# Patient Record
Sex: MALE | Race: ASIAN | NOT HISPANIC OR LATINO
[De-identification: names, ages, dates, MRNs, and addresses within clinical notes are randomized per-mention and may not be internally consistent; named-entity substitution may affect disease eponyms.]

---

## 2021-09-07 ENCOUNTER — APPOINTMENT (OUTPATIENT)
Dept: CARDIOLOGY | Facility: CLINIC | Age: 76
End: 2021-09-07
Payer: MEDICARE

## 2021-09-07 VITALS
RESPIRATION RATE: 18 BRPM | TEMPERATURE: 97.8 F | DIASTOLIC BLOOD PRESSURE: 73 MMHG | BODY MASS INDEX: 27.96 KG/M2 | SYSTOLIC BLOOD PRESSURE: 127 MMHG | WEIGHT: 174 LBS | OXYGEN SATURATION: 96 % | HEART RATE: 71 BPM

## 2021-09-07 PROCEDURE — 99214 OFFICE O/P EST MOD 30 MIN: CPT

## 2021-09-07 PROCEDURE — 93000 ELECTROCARDIOGRAM COMPLETE: CPT

## 2021-09-07 NOTE — PHYSICAL EXAM
[General Appearance - Well Developed] : well developed [Normal Appearance] : normal appearance [Well Groomed] : well groomed [General Appearance - Well Nourished] : well nourished [No Deformities] : no deformities [General Appearance - In No Acute Distress] : no acute distress [Normal Conjunctiva] : the conjunctiva exhibited no abnormalities [Eyelids - No Xanthelasma] : the eyelids demonstrated no xanthelasmas [Normal Oral Mucosa] : normal oral mucosa [No Oral Pallor] : no oral pallor [No Oral Cyanosis] : no oral cyanosis [Normal Jugular Venous A Waves Present] : normal jugular venous A waves present [Normal Jugular Venous V Waves Present] : normal jugular venous V waves present [No Jugular Venous Mack A Waves] : no jugular venous mack A waves [Heart Rate And Rhythm] : heart rate and rhythm were normal [Heart Sounds] : normal S1 and S2 [Murmurs] : no murmurs present [Arterial Pulses Normal] : the arterial pulses were normal [Edema] : no peripheral edema present [Respiration, Rhythm And Depth] : normal respiratory rhythm and effort [Exaggerated Use Of Accessory Muscles For Inspiration] : no accessory muscle use [Auscultation Breath Sounds / Voice Sounds] : lungs were clear to auscultation bilaterally [Abdomen Soft] : soft [Abdomen Tenderness] : non-tender [Abdomen Mass (___ Cm)] : no abdominal mass palpated [Abnormal Walk] : normal gait [Gait - Sufficient For Exercise Testing] : the gait was sufficient for exercise testing [Nail Clubbing] : no clubbing of the fingernails [Cyanosis, Localized] : no localized cyanosis [Petechial Hemorrhages (___cm)] : no petechial hemorrhages [] : no ischemic changes [Oriented To Time, Place, And Person] : oriented to person, place, and time [Affect] : the affect was normal [Mood] : the mood was normal [No Anxiety] : not feeling anxious

## 2021-09-10 ENCOUNTER — APPOINTMENT (OUTPATIENT)
Dept: CARDIOLOGY | Facility: CLINIC | Age: 76
End: 2021-09-10
Payer: MEDICARE

## 2021-09-10 VITALS
BODY MASS INDEX: 27.96 KG/M2 | RESPIRATION RATE: 18 BRPM | OXYGEN SATURATION: 96 % | TEMPERATURE: 97 F | SYSTOLIC BLOOD PRESSURE: 128 MMHG | DIASTOLIC BLOOD PRESSURE: 69 MMHG | HEART RATE: 70 BPM | WEIGHT: 174 LBS

## 2021-09-10 PROCEDURE — 93306 TTE W/DOPPLER COMPLETE: CPT

## 2021-09-10 PROCEDURE — ZZZZZ: CPT

## 2021-09-10 PROCEDURE — 93015 CV STRESS TEST SUPVJ I&R: CPT

## 2021-09-13 NOTE — HISTORY OF PRESENT ILLNESS
[FreeTextEntry1] : 75-year-old male with CAD status post MI status post CABG status post ICD, hypertension, diabetes, hyperlipidemia, chronic renal insufficiency, presents for followup.  \par \par Patient was last seen on 3/23/15 for cardiac evaluation.  Given his DM, I advised him to undergo a stress echo to rule out cardiac ischemia. Unfortunately it was denied by insurance and appeal was denied as well. He may have a stress test every 2 years according to his insurance and he had a nuclear stress test last year. I advised him to continue his current medications. I advised him to return in 1 year, or sooner if he has any symptoms.\par

## 2021-09-13 NOTE — REASON FOR VISIT
[Symptom and Test Evaluation] : symptom and test evaluation [FreeTextEntry1] : 9/7/21 - Patient reports substernal CP for the past one week and SOB with exertion. CP is not tender. Patient denies palpitations. Patient denies lightheadedness. Patient reports that he is taking Aspirin and Cholesterol medications.  Patient reports that symptoms are similar to before his CABG but he is able to walk farther now. He reports vegetarian diet. I advised patient to undergo an echocardiogram and a treadmill stress test. \par \par 3/23/15 -  Patient had MI in 1999 and underwent CABG at [facility].  He underwent AICD placement 7 years ago.  Patient has been stable. He denies chest pain or shortness of breath with exertion.  He walks daily.  He reports no palpitations. Patient had a syncopal episode prior to AICD placement.

## 2021-09-13 NOTE — DISCUSSION/SUMMARY
[FreeTextEntry1] : 69-year-old male with CAD status post MI status post CABG status post ICD, hypertension, diabetes, hyperlipidemia, chronic renal insufficiency, presents for cardiac evaluation.  Patient had MI in 1999 and underwent CABG at [facility].  He underwent AICD placement 7 years ago.  Patient has been stable. He denies chest pain or shortness of breath with exertion.  He walks daily.  He reports no palpitations. Patient had a syncopal episode prior to AICD placement.\par \par (1) CAD s/p MI s/p CABG - Patient is stable.  Given his DM, I advised him to undergo a stress echo to rule out cardiac ischemia. Unfortunately it was denied by insurance and appeal was denied as well.  He may have a stress test every 2 years according to his insurance and he had a nuclear stress test last year.  I advised him to continue his current medications.  I advised him to return in 1 year, or sooner if he has any symptoms.\par \par (2) Followup - 1 year.

## 2021-09-21 ENCOUNTER — APPOINTMENT (OUTPATIENT)
Dept: DISASTER EMERGENCY | Facility: CLINIC | Age: 76
End: 2021-09-21

## 2021-09-21 ENCOUNTER — LABORATORY RESULT (OUTPATIENT)
Age: 76
End: 2021-09-21

## 2021-09-24 ENCOUNTER — OUTPATIENT (OUTPATIENT)
Dept: OUTPATIENT SERVICES | Facility: HOSPITAL | Age: 76
LOS: 1 days | Discharge: HOME | End: 2021-09-24
Payer: MEDICARE

## 2021-09-24 ENCOUNTER — NON-APPOINTMENT (OUTPATIENT)
Age: 76
End: 2021-09-24

## 2021-09-24 DIAGNOSIS — Z95.1 PRESENCE OF AORTOCORONARY BYPASS GRAFT: Chronic | ICD-10-CM

## 2021-09-24 DIAGNOSIS — Z95.810 PRESENCE OF AUTOMATIC (IMPLANTABLE) CARDIAC DEFIBRILLATOR: Chronic | ICD-10-CM

## 2021-09-24 LAB
ANION GAP SERPL CALC-SCNC: 11 MMOL/L — SIGNIFICANT CHANGE UP (ref 7–14)
BUN SERPL-MCNC: 33 MG/DL — HIGH (ref 7–23)
CALCIUM SERPL-MCNC: 9.5 MG/DL — SIGNIFICANT CHANGE UP (ref 8.4–10.5)
CHLORIDE SERPL-SCNC: 102 MMOL/L — SIGNIFICANT CHANGE UP (ref 98–107)
CO2 SERPL-SCNC: 25 MMOL/L — SIGNIFICANT CHANGE UP (ref 22–31)
CREAT SERPL-MCNC: 1.87 MG/DL — HIGH (ref 0.5–1.3)
GLUCOSE BLDC GLUCOMTR-MCNC: 136 MG/DL — HIGH (ref 70–99)
GLUCOSE SERPL-MCNC: 150 MG/DL — HIGH (ref 70–99)
HCT VFR BLD CALC: 46.7 % — SIGNIFICANT CHANGE UP (ref 39–50)
HGB BLD-MCNC: 16.5 G/DL — SIGNIFICANT CHANGE UP (ref 13–17)
MCHC RBC-ENTMCNC: 32.1 PG — SIGNIFICANT CHANGE UP (ref 27–34)
MCHC RBC-ENTMCNC: 35.3 GM/DL — SIGNIFICANT CHANGE UP (ref 32–36)
MCV RBC AUTO: 90.9 FL — SIGNIFICANT CHANGE UP (ref 80–100)
NRBC # BLD: 0 /100 WBCS — SIGNIFICANT CHANGE UP
NRBC # FLD: 0 K/UL — SIGNIFICANT CHANGE UP
PLATELET # BLD AUTO: 210 K/UL — SIGNIFICANT CHANGE UP (ref 150–400)
POTASSIUM SERPL-MCNC: 4.4 MMOL/L — SIGNIFICANT CHANGE UP (ref 3.5–5.3)
POTASSIUM SERPL-SCNC: 4.4 MMOL/L — SIGNIFICANT CHANGE UP (ref 3.5–5.3)
RBC # BLD: 5.14 M/UL — SIGNIFICANT CHANGE UP (ref 4.2–5.8)
RBC # FLD: 13 % — SIGNIFICANT CHANGE UP (ref 10.3–14.5)
SODIUM SERPL-SCNC: 138 MMOL/L — SIGNIFICANT CHANGE UP (ref 135–145)
WBC # BLD: 7.45 K/UL — SIGNIFICANT CHANGE UP (ref 3.8–10.5)
WBC # FLD AUTO: 7.45 K/UL — SIGNIFICANT CHANGE UP (ref 3.8–10.5)

## 2021-09-24 PROCEDURE — 76937 US GUIDE VASCULAR ACCESS: CPT | Mod: 26

## 2021-09-24 PROCEDURE — 93010 ELECTROCARDIOGRAM REPORT: CPT

## 2021-09-24 PROCEDURE — 93459 L HRT ART/GRFT ANGIO: CPT | Mod: 26

## 2021-09-24 RX ORDER — DUTASTERIDE 0.5 MG/1
1 CAPSULE, LIQUID FILLED ORAL
Qty: 0 | Refills: 0 | DISCHARGE

## 2021-09-24 RX ORDER — ASPIRIN/CALCIUM CARB/MAGNESIUM 324 MG
1 TABLET ORAL
Qty: 0 | Refills: 0 | DISCHARGE

## 2021-09-24 RX ORDER — LINAGLIPTIN 5 MG/1
1 TABLET, FILM COATED ORAL
Qty: 0 | Refills: 0 | DISCHARGE

## 2021-09-24 RX ORDER — LOSARTAN POTASSIUM 100 MG/1
1 TABLET, FILM COATED ORAL
Qty: 0 | Refills: 0 | DISCHARGE

## 2021-09-24 RX ORDER — SODIUM CHLORIDE 9 MG/ML
500 INJECTION INTRAMUSCULAR; INTRAVENOUS; SUBCUTANEOUS
Refills: 0 | Status: DISCONTINUED | OUTPATIENT
Start: 2021-09-24 | End: 2021-10-08

## 2021-09-24 RX ORDER — ISOSORBIDE MONONITRATE 60 MG/1
1 TABLET, EXTENDED RELEASE ORAL
Qty: 30 | Refills: 0
Start: 2021-09-24 | End: 2021-10-23

## 2021-09-24 RX ORDER — ERGOCALCIFEROL 1.25 MG/1
1 CAPSULE ORAL
Qty: 0 | Refills: 0 | DISCHARGE

## 2021-09-24 RX ORDER — OMEGA-3 ACID ETHYL ESTERS 1 G
1 CAPSULE ORAL
Qty: 0 | Refills: 0 | DISCHARGE

## 2021-09-24 RX ORDER — COLCHICINE 0.6 MG
1 TABLET ORAL
Qty: 0 | Refills: 0 | DISCHARGE

## 2021-09-24 RX ORDER — CARVEDILOL PHOSPHATE 80 MG/1
1 CAPSULE, EXTENDED RELEASE ORAL
Qty: 0 | Refills: 0 | DISCHARGE

## 2021-09-24 RX ORDER — ISOSORBIDE MONONITRATE 60 MG/1
1 TABLET, EXTENDED RELEASE ORAL
Qty: 0 | Refills: 0 | DISCHARGE

## 2021-09-24 RX ORDER — ALLOPURINOL 300 MG
1 TABLET ORAL
Qty: 0 | Refills: 0 | DISCHARGE

## 2021-09-24 RX ORDER — CLOPIDOGREL BISULFATE 75 MG/1
1 TABLET, FILM COATED ORAL
Qty: 0 | Refills: 0 | DISCHARGE

## 2021-09-24 RX ORDER — SODIUM CHLORIDE 9 MG/ML
3 INJECTION INTRAMUSCULAR; INTRAVENOUS; SUBCUTANEOUS EVERY 8 HOURS
Refills: 0 | Status: DISCONTINUED | OUTPATIENT
Start: 2021-09-24 | End: 2021-10-08

## 2021-09-24 RX ORDER — ATORVASTATIN CALCIUM 80 MG/1
1 TABLET, FILM COATED ORAL
Qty: 0 | Refills: 0 | DISCHARGE

## 2021-09-24 RX ADMIN — SODIUM CHLORIDE 75 MILLILITER(S): 9 INJECTION INTRAMUSCULAR; INTRAVENOUS; SUBCUTANEOUS at 10:44

## 2021-09-24 NOTE — H&P CARDIOLOGY - NS MD HP PULSE DORSALIS
Brunner, Steven (MD)  Pediatrics  [address] [place] [address]  Phone: [phone]  Fax: [phone]  Follow Up Time: right normal/left normal

## 2021-09-24 NOTE — H&P CARDIOLOGY - NSICDXPASTMEDICALHX_GEN_ALL_CORE_FT
PAST MEDICAL HISTORY:  CAD (coronary artery disease)     CKD (chronic kidney disease)     Diabetes     HLD (hyperlipidemia)     HTN (hypertension)     MI (myocardial infarction)

## 2021-09-24 NOTE — H&P CARDIOLOGY - NSICDXPASTSURGICALHX_GEN_ALL_CORE_FT
PAST SURGICAL HISTORY:  S/P CABG (coronary artery bypass graft)     S/P ICD (internal cardiac defibrillator) procedure

## 2021-09-24 NOTE — H&P CARDIOLOGY - HISTORY OF PRESENT ILLNESS
77 y/o M w/ PMH of  CAD s/p CABG(1999), MI, s/p ICD, HTN, DM, HLD and chronic renal insufficiency presents for cardiac catheretization. Pt states that he has been experiencing exertional chest pain and shortness of breath for the past month. Patient's symptoms are relived with 5 minutes of rest. Echocardiogram showed normal LV function w/ EF 55% and inferior wall akinesis + inferolateral hypokinesis. Pt denies N/V/D, fevers, chills, cough, palpitations, syncope, orthopnea, nocturnal paroxysmal dyspnea, edema, cyanosis, heart murmurs, varicosities, phlebitis, claudication.

## 2021-09-27 PROBLEM — I25.10 ATHEROSCLEROTIC HEART DISEASE OF NATIVE CORONARY ARTERY WITHOUT ANGINA PECTORIS: Chronic | Status: ACTIVE | Noted: 2021-09-24

## 2021-09-27 PROBLEM — E11.9 TYPE 2 DIABETES MELLITUS WITHOUT COMPLICATIONS: Chronic | Status: ACTIVE | Noted: 2021-09-24

## 2021-09-27 PROBLEM — I10 ESSENTIAL (PRIMARY) HYPERTENSION: Chronic | Status: ACTIVE | Noted: 2021-09-24

## 2021-09-27 PROBLEM — N18.9 CHRONIC KIDNEY DISEASE, UNSPECIFIED: Chronic | Status: ACTIVE | Noted: 2021-09-24

## 2021-09-27 PROBLEM — E78.5 HYPERLIPIDEMIA, UNSPECIFIED: Chronic | Status: ACTIVE | Noted: 2021-09-24

## 2021-09-27 PROBLEM — I21.9 ACUTE MYOCARDIAL INFARCTION, UNSPECIFIED: Chronic | Status: ACTIVE | Noted: 2021-09-24

## 2021-09-29 NOTE — PHYSICAL EXAM
[Normal Appearance] : normal appearance [General Appearance - Well Developed] : well developed [Well Groomed] : well groomed [General Appearance - Well Nourished] : well nourished [No Deformities] : no deformities [General Appearance - In No Acute Distress] : no acute distress [Normal Conjunctiva] : the conjunctiva exhibited no abnormalities [Eyelids - No Xanthelasma] : the eyelids demonstrated no xanthelasmas [Normal Oral Mucosa] : normal oral mucosa [No Oral Pallor] : no oral pallor [No Oral Cyanosis] : no oral cyanosis [Normal Jugular Venous A Waves Present] : normal jugular venous A waves present [Normal Jugular Venous V Waves Present] : normal jugular venous V waves present [No Jugular Venous Mack A Waves] : no jugular venous mack A waves [Heart Rate And Rhythm] : heart rate and rhythm were normal [Heart Sounds] : normal S1 and S2 [Murmurs] : no murmurs present [Arterial Pulses Normal] : the arterial pulses were normal [Edema] : no peripheral edema present [Exaggerated Use Of Accessory Muscles For Inspiration] : no accessory muscle use [Respiration, Rhythm And Depth] : normal respiratory rhythm and effort [Auscultation Breath Sounds / Voice Sounds] : lungs were clear to auscultation bilaterally [Abdomen Soft] : soft [Abdomen Tenderness] : non-tender [Abdomen Mass (___ Cm)] : no abdominal mass palpated [Abnormal Walk] : normal gait [Gait - Sufficient For Exercise Testing] : the gait was sufficient for exercise testing [Nail Clubbing] : no clubbing of the fingernails [Cyanosis, Localized] : no localized cyanosis [Petechial Hemorrhages (___cm)] : no petechial hemorrhages [] : no ischemic changes [Oriented To Time, Place, And Person] : oriented to person, place, and time [Affect] : the affect was normal [Mood] : the mood was normal [No Anxiety] : not feeling anxious

## 2021-09-30 ENCOUNTER — APPOINTMENT (OUTPATIENT)
Dept: CARDIOLOGY | Facility: CLINIC | Age: 76
End: 2021-09-30
Payer: MEDICARE

## 2021-09-30 VITALS
TEMPERATURE: 98 F | RESPIRATION RATE: 17 BRPM | SYSTOLIC BLOOD PRESSURE: 126 MMHG | HEART RATE: 62 BPM | OXYGEN SATURATION: 96 % | DIASTOLIC BLOOD PRESSURE: 65 MMHG | BODY MASS INDEX: 27.8 KG/M2 | WEIGHT: 173 LBS

## 2021-09-30 DIAGNOSIS — I21.9 ACUTE MYOCARDIAL INFARCTION, UNSPECIFIED: ICD-10-CM

## 2021-09-30 DIAGNOSIS — E78.5 HYPERLIPIDEMIA, UNSPECIFIED: ICD-10-CM

## 2021-09-30 PROCEDURE — 99213 OFFICE O/P EST LOW 20 MIN: CPT

## 2021-10-24 NOTE — PHYSICAL EXAM
[General Appearance - Well Developed] : well developed [Normal Appearance] : normal appearance [Well Groomed] : well groomed [General Appearance - Well Nourished] : well nourished [No Deformities] : no deformities [Normal Conjunctiva] : the conjunctiva exhibited no abnormalities [General Appearance - In No Acute Distress] : no acute distress [Eyelids - No Xanthelasma] : the eyelids demonstrated no xanthelasmas [Normal Oral Mucosa] : normal oral mucosa [No Oral Pallor] : no oral pallor [No Oral Cyanosis] : no oral cyanosis [Normal Jugular Venous A Waves Present] : normal jugular venous A waves present [Normal Jugular Venous V Waves Present] : normal jugular venous V waves present [No Jugular Venous Mack A Waves] : no jugular venous mack A waves [Heart Rate And Rhythm] : heart rate and rhythm were normal [Heart Sounds] : normal S1 and S2 [Murmurs] : no murmurs present [Arterial Pulses Normal] : the arterial pulses were normal [Edema] : no peripheral edema present [Respiration, Rhythm And Depth] : normal respiratory rhythm and effort [Exaggerated Use Of Accessory Muscles For Inspiration] : no accessory muscle use [Auscultation Breath Sounds / Voice Sounds] : lungs were clear to auscultation bilaterally [Abdomen Soft] : soft [Abdomen Tenderness] : non-tender [Abdomen Mass (___ Cm)] : no abdominal mass palpated [Abnormal Walk] : normal gait [Gait - Sufficient For Exercise Testing] : the gait was sufficient for exercise testing [Nail Clubbing] : no clubbing of the fingernails [Cyanosis, Localized] : no localized cyanosis [Petechial Hemorrhages (___cm)] : no petechial hemorrhages [] : no ischemic changes [Oriented To Time, Place, And Person] : oriented to person, place, and time [Affect] : the affect was normal [Mood] : the mood was normal [No Anxiety] : not feeling anxious

## 2021-10-24 NOTE — REASON FOR VISIT
[Symptom and Test Evaluation] : symptom and test evaluation [FreeTextEntry1] : 9/30/21 - Patient reports feeling better on Imdur 60 mg but still symptomatic.  I advised patient to start Ranexa 500 mg BID.\par \par 9/7/21 - Patient reports substernal CP for the past one week and SOB with exertion. CP is not tender. Patient denies palpitations. Patient denies lightheadedness. Patient reports that he is taking Aspirin and Cholesterol medications.  Patient reports that symptoms are similar to before his CABG but he is able to walk farther now. He reports vegetarian diet.  Patient underwent an echocardiogram and it showed normal LV function with inferior akinesis and inferolateral hypokinesis. Patient underwent a treadmill stress test and completed 3.5 minutes of Juancarlos protocol.  There were non-diagnostic ST depressions on ECG.  He had CP.  Following treadmill stress, there was echocardiographic evidence of anterolateral ischemia.  Cath at [facility] by Dr. Oliver showed patent LIMA to LAD and closed SVG grafts.  LM lesion compromises D1 flow.  His Imdur was increased to 60 mg.\par \par 3/23/15 -  Patient had MI in 1999 and underwent CABG at [facility].  He underwent AICD placement 7 years ago.  Patient has been stable. He denies chest pain or shortness of breath with exertion.  He walks daily.  He reports no palpitations. Patient had a syncopal episode prior to AICD placement.

## 2021-10-24 NOTE — HISTORY OF PRESENT ILLNESS
[FreeTextEntry1] : 75-year-old male with CAD s/p MI s/p CABG 1999, s/p ICD around 2008, HTN, DM, HLD, chronic renal insufficiency, presents for followup.  \par \par Patient was last seen on 9/7/21.  Patient underwent an echocardiogram and it showed normal LV function with inferior akinesis and inferolateral hypokinesis. Patient underwent a treadmill stress test and completed 3.5 minutes of Juancarlos protocol.  There were non-diagnostic ST depressions on ECG.  He had CP.  Following treadmill stress, there was echocardiographic evidence of anterolateral ischemia.  I advised patient to start NTPatch 0.1 mg (not realizing that pt is on Imdur 30 mg already).  Cath at [facility] by Dr. Oliver showed patent LIMA to LAD and closed SVG grafts.  LM lesion compromises D1 flow. \par \par He is on ASA 81 mg, Plavix 75 mg, Atorvastatin 40 mg for CAD.  He is on Imdur 60 mg, Metoprolol ER 25 mg, and  Losartan 25 mg for HTN.

## 2021-10-28 ENCOUNTER — APPOINTMENT (OUTPATIENT)
Dept: CARDIOLOGY | Facility: CLINIC | Age: 76
End: 2021-10-28
Payer: MEDICARE

## 2021-10-28 VITALS
TEMPERATURE: 97.3 F | RESPIRATION RATE: 17 BRPM | BODY MASS INDEX: 27.8 KG/M2 | WEIGHT: 173 LBS | HEART RATE: 69 BPM | DIASTOLIC BLOOD PRESSURE: 69 MMHG | OXYGEN SATURATION: 97 % | SYSTOLIC BLOOD PRESSURE: 134 MMHG

## 2021-10-28 PROCEDURE — 99213 OFFICE O/P EST LOW 20 MIN: CPT

## 2021-11-03 NOTE — HISTORY OF PRESENT ILLNESS
[FreeTextEntry1] : 10/28/21 - Patient feels that the new medication is helping. He is stable and feels fine when he does not exert himself. Patient does not take BP at home. Patient reports that he can tell when his BP is elevated. Patient denies bleeding issues. FU in 3 months. \par \par 9/7/21 - Patient reports substernal CP for the past one week and SOB with exertion. CP is not tender. Patient denies palpitations. Patient denies lightheadedness. Patient reports that he is taking Aspirin and Cholesterol medications.  Patient reports that symptoms are similar to before his CABG but he is able to walk farther now. He reports vegetarian diet.  Patient underwent an echocardiogram and it showed normal LV function with inferior akinesis and inferolateral hypokinesis. Patient underwent a treadmill stress test and completed 3.5 minutes of Juancarlos protocol.  There were non-diagnostic ST depressions on ECG.  He had CP.  Following treadmill stress, there was echocardiographic evidence of anterolateral ischemia.  Cath at [facility] by Dr. Oliver showed patent LIMA to LAD and closed SVG grafts.  LM lesion compromises D1 flow.  His Imdur was increased to 60 mg.\par \par 3/23/15 -  Patient had MI in 1999 and underwent CABG at [facility].  He underwent AICD placement 7 years ago.  Patient has been stable. He denies chest pain or shortness of breath with exertion.  He walks daily.  He reports no palpitations. Patient had a syncopal episode prior to AICD placement.

## 2021-11-03 NOTE — REASON FOR VISIT
[Symptom and Test Evaluation] : symptom and test evaluation [FreeTextEntry1] : 76-year-old male with CAD s/p MI s/p CABG 1999, s/p ICD around 2008, HTN, DM, HLD, chronic renal insufficiency, presents for followup.  \par \par Patient was last seen on 9/30/21 - Patient reports feeling better on Imdur 60 mg but still symptomatic.  I advised patient to start Ranexa 500 mg BID.\par \par He is on ASA 81 mg, Plavix 75 mg, Atorvastatin 40 mg for CAD.  He is on Imdur 60 mg, Metoprolol ER 25 mg, and  Losartan 25 mg for HTN.  He is on Ranexa 500 mg BID for angina.9/30/21 - Patient reports feeling better on Imdur 60 mg but still symptomatic.  I advised patient to start Ranexa 500 mg BID.\par \par

## 2022-01-04 ENCOUNTER — RX RENEWAL (OUTPATIENT)
Age: 77
End: 2022-01-04

## 2022-02-05 ENCOUNTER — RX RENEWAL (OUTPATIENT)
Age: 77
End: 2022-02-05

## 2022-02-16 NOTE — REASON FOR VISIT
[FreeTextEntry1] : 76-year-old male with CAD s/p MI s/p CABG 1999, s/p ICD around 2008, HTN, DM, HLD, chronic renal insufficiency, presents for followup.  \par \par Patient was last seen on 10/28/21.\par \par He is on ASA 81 mg, Plavix 75 mg, Atorvastatin 40 mg for CAD.  He is on Imdur 60 mg, Metoprolol ER 25 mg, and Losartan 25 mg for HTN.  He is on Ranexa 500 mg BID for angina.\par \par

## 2022-02-16 NOTE — HISTORY OF PRESENT ILLNESS
[FreeTextEntry1] : 10/28/21 - Patient feels that the new medication is helping. He is stable and feels fine when he does not exert himself. Patient does not take BP at home. Patient reports that he can tell when his BP is elevated. Patient denies bleeding issues. FU in 3 months. \par \par 9/7/21 - Patient reports substernal CP for the past one week and SOB with exertion. CP is not tender. Patient denies palpitations. Patient denies lightheadedness. Patient reports that he is taking Aspirin and Cholesterol medications.  Patient reports that symptoms are similar to before his CABG but he is able to walk farther now. He reports vegetarian diet.  Patient underwent an echocardiogram and it showed normal LV function with inferior akinesis and inferolateral hypokinesis. Patient underwent a treadmill stress test and completed 3.5 minutes of Juancarlos protocol.  There were non-diagnostic ST depressions on ECG.  He had CP.  Following treadmill stress, there was echocardiographic evidence of anterolateral ischemia.  Cath at [facility] by Dr. Oliver showed patent LIMA to LAD and closed SVG grafts.  LM lesion compromises D1 flow.  His Imdur was increased to 60 mg.\par \par 3/23/15 -  Patient had MI in 1999 and underwent CABG at [facility].  He underwent AICD placement 7 years ago.  Patient has been stable. He denies chest pain or shortness of breath with exertion.  He walks daily.  He reports no palpitations. Patient had a syncopal episode prior to AICD placement.

## 2022-02-22 ENCOUNTER — APPOINTMENT (OUTPATIENT)
Dept: CARDIOLOGY | Facility: CLINIC | Age: 77
End: 2022-02-22
Payer: MEDICARE

## 2022-02-22 VITALS
OXYGEN SATURATION: 99 % | RESPIRATION RATE: 18 BRPM | BODY MASS INDEX: 27.32 KG/M2 | SYSTOLIC BLOOD PRESSURE: 115 MMHG | TEMPERATURE: 97.5 F | DIASTOLIC BLOOD PRESSURE: 60 MMHG | WEIGHT: 170 LBS | HEART RATE: 67 BPM

## 2022-02-22 PROCEDURE — 99214 OFFICE O/P EST MOD 30 MIN: CPT

## 2022-06-20 ENCOUNTER — RX RENEWAL (OUTPATIENT)
Age: 77
End: 2022-06-20

## 2022-06-20 RX ORDER — NITROGLYCERIN 20 MG/1
0.1 PATCH TRANSDERMAL DAILY
Qty: 30 | Refills: 5 | Status: ACTIVE | COMMUNITY
Start: 2021-09-10 | End: 1900-01-01

## 2022-06-25 ENCOUNTER — RX RENEWAL (OUTPATIENT)
Age: 77
End: 2022-06-25

## 2022-09-29 ENCOUNTER — APPOINTMENT (OUTPATIENT)
Dept: CARDIOLOGY | Facility: CLINIC | Age: 77
End: 2022-09-29

## 2022-09-29 VITALS
BODY MASS INDEX: 27 KG/M2 | DIASTOLIC BLOOD PRESSURE: 69 MMHG | SYSTOLIC BLOOD PRESSURE: 123 MMHG | HEART RATE: 74 BPM | OXYGEN SATURATION: 100 % | TEMPERATURE: 96.8 F | RESPIRATION RATE: 18 BRPM | WEIGHT: 168 LBS

## 2022-09-29 PROCEDURE — 99214 OFFICE O/P EST MOD 30 MIN: CPT

## 2022-12-05 ENCOUNTER — NON-APPOINTMENT (OUTPATIENT)
Age: 77
End: 2022-12-05

## 2023-01-23 NOTE — REASON FOR VISIT
[FreeTextEntry1] : 77-year-old male with CAD s/p MI s/p CABG 1999, s/p ICD around 2008, HTN, DM, HLD, chronic renal insufficiency, presents for followup.  \par \par Patient was last seen on 2/22/22 for followup. \par \par He is on ASA 81 mg, Atorvastatin 40 mg for CAD.  He is on Imdur 60 mg, Metoprolol ER 25 mg, and Losartan 25 mg for HTN.  He is on Ranexa 500 mg BID for angina.\par \par Cath at [facility] by Dr. Oliver 9/24/21 showed patent LIMA to LAD and closed SVG grafts. LM lesion compromises D1 flow. LM stent can be considered if patient remains symptomatic. \par \par Patient underwent an echocardiogram 9/10/21 and it showed normal LV function, inferior akinesis and inferolateral hypokinesis, without significant valvular pathology.

## 2023-01-23 NOTE — HISTORY OF PRESENT ILLNESS
[FreeTextEntry1] : 9/29/22 - Patient reports stable CP with exertion.  Patient denies SOB.  Patient denies palpitations.  Patient denies lightheadedness.  Patient denies h/o syncope.  He is on ASA 81 mg,  Atorvastatin 40 mg for CAD.  He is on Imdur 60 mg, Metoprolol ER 25 mg, and Losartan 25 mg for HTN.  He is on Ranexa 500 mg BID for angina.  I advised patient to continue current medications.  FU 6 months. \par \par 2/22/22 - Patient has been stable.  Patient reports stable CP.  Patient denies SOB.  Patient denies palpitations.  Patient denies lightheadedness.  Patient denies h/o syncope.  Patient is compliant with medications.  FU 6 months. \par \par 10/28/21 - Patient feels that the new medication is helping. He is stable and feels fine when he does not exert himself. Patient does not take BP at home. Patient reports that he can tell when his BP is elevated. Patient denies bleeding issues. FU in 3 months. \par \par 9/7/21 - Patient reports substernal CP for the past one week and SOB with exertion. CP is not tender. Patient denies palpitations. Patient denies lightheadedness. Patient reports that he is taking Aspirin and Cholesterol medications.  Patient reports that symptoms are similar to before his CABG but he is able to walk farther now. He reports vegetarian diet.  Patient underwent an echocardiogram and it showed normal LV function with inferior akinesis and inferolateral hypokinesis. Patient underwent a treadmill stress test and completed 3.5 minutes of Juancarlos protocol.  There were non-diagnostic ST depressions on ECG.  He had CP.  Following treadmill stress, there was echocardiographic evidence of anterolateral ischemia.  Cath at [facility] by Dr. Oliver showed patent LIMA to LAD and closed SVG grafts.  LM lesion compromises D1 flow.  His Imdur was increased to 60 mg.\par \par 3/23/15 -  Patient had MI in 1999 and underwent CABG at [facility].  He underwent AICD placement 7 years ago.  Patient has been stable. He denies chest pain or shortness of breath with exertion.  He walks daily.  He reports no palpitations. Patient had a syncopal episode prior to AICD placement.

## 2023-01-23 NOTE — DISCUSSION/SUMMARY
[FreeTextEntry1] : 77-year-old male with CAD s/p MI s/p CABG 1999, s/p ICD around 2008, HTN, DM, HLD, chronic renal insufficiency, presents for followup.  \par \par Patient was last seen on 2/22/22 for followup. \par \par He is on ASA 81 mg, Atorvastatin 40 mg for CAD.  He is on Imdur 60 mg, Metoprolol ER 25 mg, and Losartan 25 mg for HTN.  He is on Ranexa 500 mg BID for angina.\par \par Cath at [facility] by Dr. Oliver 9/24/21 showed patent LIMA to LAD and closed SVG grafts. LM lesion compromises D1 flow. LM stent can be considered if patient remains symptomatic. \par \par Patient underwent an echocardiogram 9/10/21 and it showed normal LV function, inferior akinesis and inferolateral hypokinesis, without significant valvular pathology. \par \par 9/29/22 - Patient reports stable CP with exertion.  Patient denies SOB.  Patient denies palpitations.  Patient denies lightheadedness.  Patient denies h/o syncope.  He is on ASA 81 mg,  Atorvastatin 40 mg for CAD.  He is on Imdur 60 mg, Metoprolol ER 25 mg, and Losartan 25 mg for HTN.  He is on Ranexa 500 mg BID for angina.  I advised patient to continue current medications.  FU 6 months. \par \par (1) CAD s/p MI s/p CABG 1999, s/p ICD around 2008, cath 9/24/21 showed patent LIMA to LAD and closed SVG grafts - Patient reports stable CP with exertion.   He is on ASA 81 mg,  Atorvastatin 40 mg for CAD.  He is on Imdur 60 mg and Ranexa 500 mg BID for angina.  I advised patient to continue current medications.  \par \par (2) HTN - His BP was good.  I advised patient to continue  Imdur 60 mg, Metoprolol ER 25 mg, and Losartan 25 mg for HTN.\par \par (3) Followup - 6 months.

## 2023-03-30 ENCOUNTER — NON-APPOINTMENT (OUTPATIENT)
Age: 78
End: 2023-03-30

## 2023-03-30 ENCOUNTER — APPOINTMENT (OUTPATIENT)
Dept: CARDIOLOGY | Facility: CLINIC | Age: 78
End: 2023-03-30
Payer: MEDICARE

## 2023-03-30 VITALS
DIASTOLIC BLOOD PRESSURE: 74 MMHG | OXYGEN SATURATION: 98 % | RESPIRATION RATE: 18 BRPM | WEIGHT: 165 LBS | TEMPERATURE: 97.6 F | HEART RATE: 62 BPM | SYSTOLIC BLOOD PRESSURE: 126 MMHG | BODY MASS INDEX: 26.52 KG/M2

## 2023-03-30 DIAGNOSIS — R06.02 SHORTNESS OF BREATH: ICD-10-CM

## 2023-03-30 DIAGNOSIS — R07.9 CHEST PAIN, UNSPECIFIED: ICD-10-CM

## 2023-03-30 PROCEDURE — 99214 OFFICE O/P EST MOD 30 MIN: CPT | Mod: 25

## 2023-03-30 PROCEDURE — 93000 ELECTROCARDIOGRAM COMPLETE: CPT

## 2023-03-30 RX ORDER — SACUBITRIL AND VALSARTAN 24; 26 MG/1; MG/1
24-26 TABLET, FILM COATED ORAL
Refills: 0 | Status: ACTIVE | COMMUNITY

## 2023-03-30 RX ORDER — FEBUXOSTAT 80 MG/1
TABLET ORAL
Refills: 0 | Status: ACTIVE | COMMUNITY

## 2023-03-30 RX ORDER — TIMOLOL MALEATE 5 MG/ML
0.5 SOLUTION OPHTHALMIC
Refills: 0 | Status: ACTIVE | COMMUNITY

## 2023-03-30 RX ORDER — VERICIGUAT 5 MG/1
5 TABLET, FILM COATED ORAL
Refills: 0 | Status: ACTIVE | COMMUNITY

## 2023-03-30 RX ORDER — INSULIN GLARGINE 100 [IU]/ML
INJECTION, SOLUTION SUBCUTANEOUS
Refills: 0 | Status: ACTIVE | COMMUNITY

## 2023-03-30 RX ORDER — DAPAGLIFLOZIN 10 MG/1
10 TABLET, FILM COATED ORAL
Refills: 0 | Status: ACTIVE | COMMUNITY

## 2023-03-31 PROBLEM — R07.9 CHEST PAIN: Status: ACTIVE | Noted: 2021-09-07

## 2023-03-31 PROBLEM — R06.02 SOB (SHORTNESS OF BREATH): Status: ACTIVE | Noted: 2023-03-31

## 2023-03-31 NOTE — REASON FOR VISIT
[FreeTextEntry1] : 77-year-old male with CAD s/p MI s/p CABG 1999, s/p ICD around 2008, HTN, DM, HLD, CKD, presents for followup.  \par \par Patient was last seen on 9/29/22 for followup.  Patient reported stable CP with exertion.  I advised patient to continue current medications.   \par \par He is on ASA 81 mg, Atorvastatin 40 mg for CAD.  He is on Imdur 60 mg, Metoprolol ER 25 mg, and Losartan 25 mg for HTN.  He is on Ranexa 500 mg BID for angina.\par \par Cath at [facility] by Dr. Oliver 9/24/21 showed patent LIMA to LAD and closed SVG grafts. LM lesion compromises D1 flow. LM stent can be considered if patient remains symptomatic. \par \par Patient underwent an echocardiogram 9/10/21 and it showed normal LV function, inferior akinesis and inferolateral hypokinesis, without significant valvular pathology.

## 2023-03-31 NOTE — HISTORY OF PRESENT ILLNESS
[FreeTextEntry1] : 3/30/23 - Patient reports CP and SOB with mild exertion. MISTRY is stable. Patient denies palpitations. Patient denies h/o syncope. Patient is on Metoprolol ER 25 mg. no longer taking Imdur or Losartan. \par \par 9/29/22 - Patient reports stable CP with exertion.  Patient denies SOB.  Patient denies palpitations.  Patient denies lightheadedness.  Patient denies h/o syncope.  He is on ASA 81 mg,  Atorvastatin 40 mg for CAD.  He is on Imdur 60 mg, Metoprolol ER 25 mg, and Losartan 25 mg for HTN.  He is on Ranexa 500 mg BID for angina.  I advised patient to continue current medications.  FU 6 months. \par \par 2/22/22 - Patient has been stable.  Patient reports stable CP.  Patient denies SOB.  Patient denies palpitations.  Patient denies lightheadedness.  Patient denies h/o syncope.  Patient is compliant with medications.  FU 6 months. \par \par 10/28/21 - Patient feels that the new medication is helping. He is stable and feels fine when he does not exert himself. Patient does not take BP at home. Patient reports that he can tell when his BP is elevated. Patient denies bleeding issues. FU in 3 months. \par \par 9/7/21 - Patient reports substernal CP for the past one week and SOB with exertion. CP is not tender. Patient denies palpitations. Patient denies lightheadedness. Patient reports that he is taking Aspirin and Cholesterol medications.  Patient reports that symptoms are similar to before his CABG but he is able to walk farther now. He reports vegetarian diet.  Patient underwent an echocardiogram and it showed normal LV function with inferior akinesis and inferolateral hypokinesis. Patient underwent a treadmill stress test and completed 3.5 minutes of Juancarlos protocol.  There were non-diagnostic ST depressions on ECG.  He had CP.  Following treadmill stress, there was echocardiographic evidence of anterolateral ischemia.  Cath at [facility] by Dr. Oliver showed patent LIMA to LAD and closed SVG grafts.  LM lesion compromises D1 flow.  His Imdur was increased to 60 mg.\par \par 3/23/15 -  Patient had MI in 1999 and underwent CABG at [facility].  He underwent AICD placement 7 years ago.  Patient has been stable. He denies chest pain or shortness of breath with exertion.  He walks daily.  He reports no palpitations. Patient had a syncopal episode prior to AICD placement.

## 2023-09-28 ENCOUNTER — APPOINTMENT (OUTPATIENT)
Dept: CARDIOLOGY | Facility: CLINIC | Age: 78
End: 2023-09-28
Payer: MEDICARE

## 2023-09-28 VITALS
DIASTOLIC BLOOD PRESSURE: 71 MMHG | WEIGHT: 164 LBS | BODY MASS INDEX: 26.36 KG/M2 | OXYGEN SATURATION: 97 % | HEART RATE: 67 BPM | RESPIRATION RATE: 18 BRPM | SYSTOLIC BLOOD PRESSURE: 123 MMHG | TEMPERATURE: 97.5 F

## 2023-09-28 PROCEDURE — 93306 TTE W/DOPPLER COMPLETE: CPT

## 2023-09-28 PROCEDURE — 99214 OFFICE O/P EST MOD 30 MIN: CPT

## 2024-02-26 ENCOUNTER — APPOINTMENT (OUTPATIENT)
Dept: CARDIOLOGY | Facility: CLINIC | Age: 79
End: 2024-02-26
Payer: MEDICARE

## 2024-02-26 VITALS
DIASTOLIC BLOOD PRESSURE: 69 MMHG | WEIGHT: 163 LBS | RESPIRATION RATE: 16 BRPM | TEMPERATURE: 96.8 F | BODY MASS INDEX: 26.2 KG/M2 | SYSTOLIC BLOOD PRESSURE: 117 MMHG | OXYGEN SATURATION: 97 % | HEART RATE: 81 BPM

## 2024-02-26 DIAGNOSIS — I25.5 ISCHEMIC CARDIOMYOPATHY: ICD-10-CM

## 2024-02-26 DIAGNOSIS — I25.10 ATHEROSCLEROTIC HEART DISEASE OF NATIVE CORONARY ARTERY W/OUT ANGINA PECTORIS: ICD-10-CM

## 2024-02-26 DIAGNOSIS — Z95.1 PRESENCE OF AORTOCORONARY BYPASS GRAFT: ICD-10-CM

## 2024-02-26 DIAGNOSIS — I10 ESSENTIAL (PRIMARY) HYPERTENSION: ICD-10-CM

## 2024-02-26 PROCEDURE — 99214 OFFICE O/P EST MOD 30 MIN: CPT

## 2024-02-26 RX ORDER — RANOLAZINE 500 MG/1
500 TABLET, EXTENDED RELEASE ORAL
Qty: 180 | Refills: 1 | Status: ACTIVE | COMMUNITY
Start: 2021-09-30 | End: 1900-01-01

## 2024-02-26 NOTE — PHYSICAL EXAM
[General Appearance - Well Developed] : well developed [Normal Appearance] : normal appearance [Well Groomed] : well groomed [General Appearance - Well Nourished] : well nourished [No Deformities] : no deformities [General Appearance - In No Acute Distress] : no acute distress [Normal Conjunctiva] : the conjunctiva exhibited no abnormalities [Eyelids - No Xanthelasma] : the eyelids demonstrated no xanthelasmas [Normal Oral Mucosa] : normal oral mucosa [No Oral Pallor] : no oral pallor [No Oral Cyanosis] : no oral cyanosis [Normal Jugular Venous V Waves Present] : normal jugular venous V waves present [Normal Jugular Venous A Waves Present] : normal jugular venous A waves present [No Jugular Venous Mack A Waves] : no jugular venous mack A waves [Heart Rate And Rhythm] : heart rate and rhythm were normal [Heart Sounds] : normal S1 and S2 [Murmurs] : no murmurs present [Arterial Pulses Normal] : the arterial pulses were normal [Edema] : no peripheral edema present [Respiration, Rhythm And Depth] : normal respiratory rhythm and effort [Exaggerated Use Of Accessory Muscles For Inspiration] : no accessory muscle use [Auscultation Breath Sounds / Voice Sounds] : lungs were clear to auscultation bilaterally [Abdomen Soft] : soft [Abdomen Tenderness] : non-tender [Abdomen Mass (___ Cm)] : no abdominal mass palpated [Abnormal Walk] : normal gait [Gait - Sufficient For Exercise Testing] : the gait was sufficient for exercise testing [Nail Clubbing] : no clubbing of the fingernails [Cyanosis, Localized] : no localized cyanosis [Petechial Hemorrhages (___cm)] : no petechial hemorrhages [] : no ischemic changes [Oriented To Time, Place, And Person] : oriented to person, place, and time [Affect] : the affect was normal [No Anxiety] : not feeling anxious [Mood] : the mood was normal

## 2024-02-28 NOTE — HISTORY OF PRESENT ILLNESS
[FreeTextEntry1] : 2/26/24 - Patient returns for followup.  Patient has been stable.  Patient denies CP, SOB, palpitations, or lightheadedness.  Insurance has denied Verquvo.  Patient requests Imdur 30 mg.  /69 HR 81.  Exam unremarkable.  ECG with PCP showed poor R wave progression.  I advised patient that he does not Verquvo because he has not had recent CHF hospitalization.  I have no objection resuming Imdur 30 mg.  I advised patient to watch out for lightheadedness.  FU 6 months.   9/28/23 - Patient has been stable.  Patient reports increased CP and SOB with exertion but not too bad.  Patient denies palpitations.  Patient denies lightheadedness.  Patient denies h/o syncope.  He is on ASA 81 mg, Atorvastatin 40 mg for CAD.  He is on Metoprolol ER 25 mg for HTN.  He is on Ranexa 500 mg BID for angina.  He is on Farxiga 10 mg, Verquvo 5 mg, and Entresto 24-26 mg BID started by EP Dr. Patrick.  I advised patient to continue current medications.  I advised patient to undergo an echocardiogram.  Patient underwent an echocardiogram and it showed normal LV function, basal inferior akinesis and basal inferolateral hypokinesis, without significant valvular pathology.  FU 6 months.   (1) CAD s/p MI s/p CABG 1999, s/p ICD around 2008, cath 9/24/21 showed patent LIMA to LAD and closed SVG grafts - Patient reports stable CP and SOB with exertion.   He is on ASA 81 mg, Atorvastatin 40 mg for CAD.  He is on Metoprolol ER 25 mg and Ranexa 500 mg BID for angina.  He may be off Imdur 60 mg.  I advised patient to continue current medications.   (2) MISTRY - Patient was started on Farxiga 10 mg, Verquvo 5 mg, and Entresto 24-26 mg BID by EP Dr. Patrick, presumably for HFrEF.  Patient underwent an echocardiogram and it showed normal LV function, basal inferior akinesis and basal inferolateral hypokinesis, without significant valvular pathology.  His EF is normal.  I advised patient to continue current medications for now.  I will consider stopping Verquvo in the future.    (3) HTN - His BP was good.  I advised patient to continue Metoprolol ER 25 mg, Entresto 24-26 mg BID.  (4) Followup - 6 months.   3/30/23 - Patient reports CP and SOB with mild exertion. MISTRY is stable. Patient denies palpitations. Patient denies h/o syncope. Patient is on Metoprolol ER 25 mg. no longer taking Imdur or Losartan.   9/29/22 - Patient reports stable CP with exertion.  Patient denies SOB.  Patient denies palpitations.  Patient denies lightheadedness.  Patient denies h/o syncope.  He is on ASA 81 mg,  Atorvastatin 40 mg for CAD.  He is on Imdur 60 mg, Metoprolol ER 25 mg, and Losartan 25 mg for HTN.  He is on Ranexa 500 mg BID for angina.  I advised patient to continue current medications.  FU 6 months.   2/22/22 - Patient has been stable.  Patient reports stable CP.  Patient denies SOB.  Patient denies palpitations.  Patient denies lightheadedness.  Patient denies h/o syncope.  Patient is compliant with medications.  FU 6 months.   10/28/21 - Patient feels that the new medication is helping. He is stable and feels fine when he does not exert himself. Patient does not take BP at home. Patient reports that he can tell when his BP is elevated. Patient denies bleeding issues. FU in 3 months.   9/7/21 - Patient reports substernal CP for the past one week and SOB with exertion. CP is not tender. Patient denies palpitations. Patient denies lightheadedness. Patient reports that he is taking Aspirin and Cholesterol medications.  Patient reports that symptoms are similar to before his CABG but he is able to walk farther now. He reports vegetarian diet.  Patient underwent an echocardiogram and it showed normal LV function with inferior akinesis and inferolateral hypokinesis. Patient underwent a treadmill stress test and completed 3.5 minutes of Juancarlos protocol.  There were non-diagnostic ST depressions on ECG.  He had CP.  Following treadmill stress, there was echocardiographic evidence of anterolateral ischemia.  Cath at [facility] by Dr. Oliver showed patent LIMA to LAD and closed SVG grafts.  LM lesion compromises D1 flow.  His Imdur was increased to 60 mg.  3/23/15 -  Patient had MI in 1999 and underwent CABG at [facility].  He underwent AICD placement 7 years ago.  Patient has been stable. He denies chest pain or shortness of breath with exertion.  He walks daily.  He reports no palpitations. Patient had a syncopal episode prior to AICD placement.

## 2024-02-28 NOTE — REASON FOR VISIT
[FreeTextEntry1] : 78 year-old male with CAD s/p MI s/p CABG 1999, s/p AICD around 2008, HFrEF, HTN, DM, HLD, CKD (2.05), presents for followup.    Patient was last seen on 9/28/23 - Patient has been stable. Patient reports increased CP and SOB with exertion but not too bad. Patient denies palpitations. Patient denies lightheadedness. Patient denies h/o syncope. He is on ASA 81 mg, Atorvastatin 40 mg for CAD. He is on Metoprolol ER 25 mg for HTN. He is on Ranexa 500 mg BID for angina. He is on Farxiga 10 mg, Verquvo 5 mg, and Entresto 24-26 mg BID started by EP Dr. Patrick. I advised patient to continue current medications. I advised patient to undergo an echocardiogram. Patient underwent an echocardiogram and it showed normal LV function, basal inferior akinesis and basal inferolateral hypokinesis, without significant valvular pathology. FU 6 months.  He is on ASA 81 mg, Atorvastatin 40 mg for CAD.  He is on Metoprolol ER 25 mg for HTN.  He is on Ranexa 500 mg BID for angina.  He is on Farxiga 10 mg, Verquvo 5 mg, and Entresto 24-26 mg BID for HFrEF.  He may be OFF Imdur 60 mg.  Cath at [facility] by Dr. Oliver 9/24/21 showed patent LIMA to LAD and closed SVG grafts. LM lesion compromises D1 flow. LM stent can be considered if patient remains symptomatic.   Patient underwent an echocardiogram 9/10/21 and it showed normal LV function, inferior akinesis and inferolateral hypokinesis, without significant valvular pathology.

## 2024-08-14 ENCOUNTER — RX RENEWAL (OUTPATIENT)
Age: 79
End: 2024-08-14

## 2024-08-25 NOTE — PHYSICAL EXAM
[Normal Appearance] : normal appearance [General Appearance - Well Developed] : well developed [Well Groomed] : well groomed [General Appearance - Well Nourished] : well nourished [No Deformities] : no deformities [Normal Conjunctiva] : the conjunctiva exhibited no abnormalities [General Appearance - In No Acute Distress] : no acute distress [Eyelids - No Xanthelasma] : the eyelids demonstrated no xanthelasmas [Normal Oral Mucosa] : normal oral mucosa [No Oral Pallor] : no oral pallor [No Oral Cyanosis] : no oral cyanosis [Normal Jugular Venous A Waves Present] : normal jugular venous A waves present [Normal Jugular Venous V Waves Present] : normal jugular venous V waves present [No Jugular Venous Mack A Waves] : no jugular venous mack A waves [Heart Rate And Rhythm] : heart rate and rhythm were normal [Heart Sounds] : normal S1 and S2 [Murmurs] : no murmurs present [Arterial Pulses Normal] : the arterial pulses were normal [Edema] : no peripheral edema present [Respiration, Rhythm And Depth] : normal respiratory rhythm and effort [Exaggerated Use Of Accessory Muscles For Inspiration] : no accessory muscle use [Auscultation Breath Sounds / Voice Sounds] : lungs were clear to auscultation bilaterally [Abdomen Soft] : soft [Abdomen Tenderness] : non-tender [Abdomen Mass (___ Cm)] : no abdominal mass palpated [Abnormal Walk] : normal gait [Gait - Sufficient For Exercise Testing] : the gait was sufficient for exercise testing [Nail Clubbing] : no clubbing of the fingernails [Cyanosis, Localized] : no localized cyanosis [Petechial Hemorrhages (___cm)] : no petechial hemorrhages [] : no ischemic changes [Affect] : the affect was normal [Oriented To Time, Place, And Person] : oriented to person, place, and time [Mood] : the mood was normal [No Anxiety] : not feeling anxious

## 2024-08-27 ENCOUNTER — APPOINTMENT (OUTPATIENT)
Dept: CARDIOLOGY | Facility: CLINIC | Age: 79
End: 2024-08-27

## 2024-08-27 VITALS
WEIGHT: 168 LBS | SYSTOLIC BLOOD PRESSURE: 112 MMHG | HEART RATE: 70 BPM | RESPIRATION RATE: 18 BRPM | OXYGEN SATURATION: 97 % | BODY MASS INDEX: 27 KG/M2 | TEMPERATURE: 97.2 F | DIASTOLIC BLOOD PRESSURE: 61 MMHG

## 2024-08-27 DIAGNOSIS — Z95.810 PRESENCE OF AUTOMATIC (IMPLANTABLE) CARDIAC DEFIBRILLATOR: ICD-10-CM

## 2024-08-27 DIAGNOSIS — I10 ESSENTIAL (PRIMARY) HYPERTENSION: ICD-10-CM

## 2024-08-27 DIAGNOSIS — Z95.1 PRESENCE OF AORTOCORONARY BYPASS GRAFT: ICD-10-CM

## 2024-08-27 DIAGNOSIS — I25.10 ATHEROSCLEROTIC HEART DISEASE OF NATIVE CORONARY ARTERY W/OUT ANGINA PECTORIS: ICD-10-CM

## 2024-08-27 PROCEDURE — 93306 TTE W/DOPPLER COMPLETE: CPT

## 2024-08-27 PROCEDURE — 99214 OFFICE O/P EST MOD 30 MIN: CPT

## 2024-08-27 NOTE — HISTORY OF PRESENT ILLNESS
[FreeTextEntry1] : 8/27/24 - Please refer to NP note below.  Pt is here for follow up. Pt reports stable SSCP if walking too fast but he seldom walks very fast. Patient denies SOB, palpitations, or lightheadedness. Patient denies h/o syncope. Pt has daily walking exercise for 1 hour. Pt denies any bleeding issues with ASA. Pt is on ASA 81 mg, Atorvastatin 40 mg for CAD. He is on Metoprolol ER 25 mg for HTN. He is on Imdur 30 mg, Ranexa 500 mg BID, for angina. He is on Farxiga 10 mg, and Entresto 24-26 mg BID for HFrEF.  His home SBP ranged 100-120. Today's /61 P 70.  Exam unremarkable.  I advised patient to undergo an echocardiogram.  He needs refill for Ranexa.  FU 6 months.   2/26/24 - Patient returns for followup.  Patient has been stable.  Patient denies CP, SOB, palpitations, or lightheadedness.  Insurance has denied Verquvo.  Patient requests Imdur 30 mg.  /69 HR 81.  Exam unremarkable.  ECG with PCP showed poor R wave progression.  I advised patient that he does not Verquvo because he has not had recent CHF hospitalization.  I have no objection resuming Imdur 30 mg.  I advised patient to watch out for lightheadedness.  FU 6 months.   9/28/23 - Patient has been stable.  Patient reports increased CP and SOB with exertion but not too bad.  Patient denies palpitations.  Patient denies lightheadedness.  Patient denies h/o syncope.  He is on ASA 81 mg, Atorvastatin 40 mg for CAD.  He is on Metoprolol ER 25 mg for HTN.  He is on Ranexa 500 mg BID for angina.  He is on Farxiga 10 mg, Verquvo 5 mg, and Entresto 24-26 mg BID started by EP Dr. Patrick.  I advised patient to continue current medications.  I advised patient to undergo an echocardiogram.  Patient underwent an echocardiogram and it showed normal LV function, basal inferior akinesis and basal inferolateral hypokinesis, without significant valvular pathology.  FU 6 months.   (1) CAD s/p MI s/p CABG 1999, s/p ICD around 2008, cath 9/24/21 showed patent LIMA to LAD and closed SVG grafts - Patient reports stable CP and SOB with exertion.   He is on ASA 81 mg, Atorvastatin 40 mg for CAD.  He is on Metoprolol ER 25 mg and Ranexa 500 mg BID for angina.  He may be off Imdur 60 mg.  I advised patient to continue current medications.   (2) MISTRY - Patient was started on Farxiga 10 mg, Verquvo 5 mg, and Entresto 24-26 mg BID by EP Dr. Patrick, presumably for HFrEF.  Patient underwent an echocardiogram and it showed normal LV function, basal inferior akinesis and basal inferolateral hypokinesis, without significant valvular pathology.  His EF is normal.  I advised patient to continue current medications for now.  I will consider stopping Verquvo in the future.    (3) HTN - His BP was good.  I advised patient to continue Metoprolol ER 25 mg, Entresto 24-26 mg BID.  (4) Followup - 6 months.   3/30/23 - Patient reports CP and SOB with mild exertion. MISTRY is stable. Patient denies palpitations. Patient denies h/o syncope. Patient is on Metoprolol ER 25 mg. no longer taking Imdur or Losartan.   9/29/22 - Patient reports stable CP with exertion.  Patient denies SOB.  Patient denies palpitations.  Patient denies lightheadedness.  Patient denies h/o syncope.  He is on ASA 81 mg,  Atorvastatin 40 mg for CAD.  He is on Imdur 60 mg, Metoprolol ER 25 mg, and Losartan 25 mg for HTN.  He is on Ranexa 500 mg BID for angina.  I advised patient to continue current medications.  FU 6 months.   2/22/22 - Patient has been stable.  Patient reports stable CP.  Patient denies SOB.  Patient denies palpitations.  Patient denies lightheadedness.  Patient denies h/o syncope.  Patient is compliant with medications.  FU 6 months.   10/28/21 - Patient feels that the new medication is helping. He is stable and feels fine when he does not exert himself. Patient does not take BP at home. Patient reports that he can tell when his BP is elevated. Patient denies bleeding issues. FU in 3 months.   9/7/21 - Patient reports substernal CP for the past one week and SOB with exertion. CP is not tender. Patient denies palpitations. Patient denies lightheadedness. Patient reports that he is taking Aspirin and Cholesterol medications.  Patient reports that symptoms are similar to before his CABG but he is able to walk farther now. He reports vegetarian diet.  Patient underwent an echocardiogram and it showed normal LV function with inferior akinesis and inferolateral hypokinesis. Patient underwent a treadmill stress test and completed 3.5 minutes of Juancarlos protocol.  There were non-diagnostic ST depressions on ECG.  He had CP.  Following treadmill stress, there was echocardiographic evidence of anterolateral ischemia.  Cath at [facility] by Dr. Oliver showed patent LIMA to LAD and closed SVG grafts.  LM lesion compromises D1 flow.  His Imdur was increased to 60 mg.  3/23/15 -  Patient had MI in 1999 and underwent CABG at [facility].  He underwent AICD placement 7 years ago.  Patient has been stable. He denies chest pain or shortness of breath with exertion.  He walks daily.  He reports no palpitations. Patient had a syncopal episode prior to AICD placement.

## 2024-08-27 NOTE — REASON FOR VISIT
[FreeTextEntry1] : 78 year-old male with CAD s/p MI s/p CABG 1999, s/p AICD around 2008 followed by Dr. Patrick, HFrEF, HTN, DM, HLD (LDL 31), CKD (2.05), presents for followup.    Patient was last seen on 2/26/24 - Patient returns for followup. Patient has been stable. Patient denies CP, SOB, palpitations, or lightheadedness. Insurance has denied Verquvo. Patient requests Imdur 30 mg. /69 HR 81. Exam unremarkable. ECG with PCP showed poor R wave progression. I advised patient that he does not Verquvo because he has not had recent CHF hospitalization. I have no objection resuming Imdur 30 mg. I advised patient to watch out for lightheadedness. FU 6 months.    He is on ASA 81 mg, Atorvastatin 40 mg for CAD.  He is on Imdur 30 mg, Metoprolol ER 25 mg for HTN.  He is on Ranexa 500 mg BID for angina.  He is on Farxiga 10 mg, and Entresto 24-26 mg BID for HFrEF.    Patient underwent an echocardiogram 9/28/23 and it showed normal LV function, basal inferior akinesis and basal inferolateral hypokinesis, without significant valvular pathology. FU 6 months.  Cath at [facility] by Dr. Oliver 9/24/21 showed patent LIMA to LAD and closed SVG grafts. LM lesion compromises D1 flow. LM stent can be considered if patient remains symptomatic.   Patient underwent an echocardiogram 9/10/21 and it showed normal LV function, inferior akinesis and inferolateral hypokinesis, without significant valvular pathology.

## 2024-08-27 NOTE — HISTORY OF PRESENT ILLNESS
[FreeTextEntry1] : 8/27/24 - Please refer to NP note below.  Pt is here for follow up. Pt reports stable SSCP if walking too fast but he seldom walks very fast. Patient denies SOB, palpitations, or lightheadedness. Patient denies h/o syncope. Pt has daily walking exercise for 1 hour. Pt denies any bleeding issues with ASA. Pt is on ASA 81 mg, Atorvastatin 40 mg for CAD. He is on Metoprolol ER 25 mg for HTN. He is on Imdur 30 mg, Ranexa 500 mg BID, for angina. He is on Farxiga 10 mg, and Entresto 24-26 mg BID for HFrEF.  His home SBP ranged 100-120. Today's /61 P 70.  Exam unremarkable.  I advised patient to undergo an echocardiogram.  He needs refill for Ranexa.  FU 6 months.   2/26/24 - Patient returns for followup.  Patient has been stable.  Patient denies CP, SOB, palpitations, or lightheadedness.  Insurance has denied Verquvo.  Patient requests Imdur 30 mg.  /69 HR 81.  Exam unremarkable.  ECG with PCP showed poor R wave progression.  I advised patient that he does not Verquvo because he has not had recent CHF hospitalization.  I have no objection resuming Imdur 30 mg.  I advised patient to watch out for lightheadedness.  FU 6 months.   9/28/23 - Patient has been stable.  Patient reports increased CP and SOB with exertion but not too bad.  Patient denies palpitations.  Patient denies lightheadedness.  Patient denies h/o syncope.  He is on ASA 81 mg, Atorvastatin 40 mg for CAD.  He is on Metoprolol ER 25 mg for HTN.  He is on Ranexa 500 mg BID for angina.  He is on Farxiga 10 mg, Verquvo 5 mg, and Entresto 24-26 mg BID started by EP Dr. Patrick.  I advised patient to continue current medications.  I advised patient to undergo an echocardiogram.  Patient underwent an echocardiogram and it showed normal LV function, basal inferior akinesis and basal inferolateral hypokinesis, without significant valvular pathology.  FU 6 months.   (1) CAD s/p MI s/p CABG 1999, s/p ICD around 2008, cath 9/24/21 showed patent LIMA to LAD and closed SVG grafts - Patient reports stable CP and SOB with exertion.   He is on ASA 81 mg, Atorvastatin 40 mg for CAD.  He is on Metoprolol ER 25 mg and Ranexa 500 mg BID for angina.  He may be off Imdur 60 mg.  I advised patient to continue current medications.   (2) MISTRY - Patient was started on Farxiga 10 mg, Verquvo 5 mg, and Entresto 24-26 mg BID by EP Dr. Patrick, presumably for HFrEF.  Patient underwent an echocardiogram and it showed normal LV function, basal inferior akinesis and basal inferolateral hypokinesis, without significant valvular pathology.  His EF is normal.  I advised patient to continue current medications for now.  I will consider stopping Verquvo in the future.    (3) HTN - His BP was good.  I advised patient to continue Metoprolol ER 25 mg, Entresto 24-26 mg BID.  (4) Followup - 6 months.   3/30/23 - Patient reports CP and SOB with mild exertion. MISTRY is stable. Patient denies palpitations. Patient denies h/o syncope. Patient is on Metoprolol ER 25 mg. no longer taking Imdur or Losartan.   9/29/22 - Patient reports stable CP with exertion.  Patient denies SOB.  Patient denies palpitations.  Patient denies lightheadedness.  Patient denies h/o syncope.  He is on ASA 81 mg,  Atorvastatin 40 mg for CAD.  He is on Imdur 60 mg, Metoprolol ER 25 mg, and Losartan 25 mg for HTN.  He is on Ranexa 500 mg BID for angina.  I advised patient to continue current medications.  FU 6 months.   2/22/22 - Patient has been stable.  Patient reports stable CP.  Patient denies SOB.  Patient denies palpitations.  Patient denies lightheadedness.  Patient denies h/o syncope.  Patient is compliant with medications.  FU 6 months.   10/28/21 - Patient feels that the new medication is helping. He is stable and feels fine when he does not exert himself. Patient does not take BP at home. Patient reports that he can tell when his BP is elevated. Patient denies bleeding issues. FU in 3 months.   9/7/21 - Patient reports substernal CP for the past one week and SOB with exertion. CP is not tender. Patient denies palpitations. Patient denies lightheadedness. Patient reports that he is taking Aspirin and Cholesterol medications.  Patient reports that symptoms are similar to before his CABG but he is able to walk farther now. He reports vegetarian diet.  Patient underwent an echocardiogram and it showed normal LV function with inferior akinesis and inferolateral hypokinesis. Patient underwent a treadmill stress test and completed 3.5 minutes of Juancarlos protocol.  There were non-diagnostic ST depressions on ECG.  He had CP.  Following treadmill stress, there was echocardiographic evidence of anterolateral ischemia.  Cath at [facility] by Dr. Oliver showed patent LIMA to LAD and closed SVG grafts.  LM lesion compromises D1 flow.  His Imdur was increased to 60 mg.  3/23/15 -  Patient had MI in 1999 and underwent CABG at [facility].  He underwent AICD placement 7 years ago.  Patient has been stable. He denies chest pain or shortness of breath with exertion.  He walks daily.  He reports no palpitations. Patient had a syncopal episode prior to AICD placement.

## 2025-02-25 ENCOUNTER — APPOINTMENT (OUTPATIENT)
Dept: CARDIOLOGY | Facility: CLINIC | Age: 80
End: 2025-02-25
Payer: MEDICARE

## 2025-02-25 VITALS
DIASTOLIC BLOOD PRESSURE: 63 MMHG | WEIGHT: 161 LBS | BODY MASS INDEX: 25.88 KG/M2 | RESPIRATION RATE: 16 BRPM | OXYGEN SATURATION: 99 % | SYSTOLIC BLOOD PRESSURE: 102 MMHG | HEART RATE: 72 BPM

## 2025-02-25 DIAGNOSIS — I25.10 ATHEROSCLEROTIC HEART DISEASE OF NATIVE CORONARY ARTERY W/OUT ANGINA PECTORIS: ICD-10-CM

## 2025-02-25 DIAGNOSIS — I10 ESSENTIAL (PRIMARY) HYPERTENSION: ICD-10-CM

## 2025-02-25 DIAGNOSIS — Z95.810 PRESENCE OF AUTOMATIC (IMPLANTABLE) CARDIAC DEFIBRILLATOR: ICD-10-CM

## 2025-02-25 DIAGNOSIS — Z95.1 PRESENCE OF AORTOCORONARY BYPASS GRAFT: ICD-10-CM

## 2025-02-25 PROCEDURE — 99214 OFFICE O/P EST MOD 30 MIN: CPT

## 2025-08-04 ENCOUNTER — RX RENEWAL (OUTPATIENT)
Age: 80
End: 2025-08-04

## 2025-08-27 ENCOUNTER — APPOINTMENT (OUTPATIENT)
Dept: CARDIOLOGY | Facility: CLINIC | Age: 80
End: 2025-08-27
Payer: MEDICARE

## 2025-08-27 VITALS
RESPIRATION RATE: 15 BRPM | SYSTOLIC BLOOD PRESSURE: 125 MMHG | DIASTOLIC BLOOD PRESSURE: 66 MMHG | OXYGEN SATURATION: 97 % | BODY MASS INDEX: 26.84 KG/M2 | HEART RATE: 66 BPM | WEIGHT: 167 LBS

## 2025-08-27 DIAGNOSIS — R06.02 SHORTNESS OF BREATH: ICD-10-CM

## 2025-08-27 DIAGNOSIS — Z95.1 PRESENCE OF AORTOCORONARY BYPASS GRAFT: ICD-10-CM

## 2025-08-27 DIAGNOSIS — Z95.810 PRESENCE OF AUTOMATIC (IMPLANTABLE) CARDIAC DEFIBRILLATOR: ICD-10-CM

## 2025-08-27 DIAGNOSIS — I25.10 ATHEROSCLEROTIC HEART DISEASE OF NATIVE CORONARY ARTERY W/OUT ANGINA PECTORIS: ICD-10-CM

## 2025-08-27 DIAGNOSIS — I10 ESSENTIAL (PRIMARY) HYPERTENSION: ICD-10-CM

## 2025-08-27 PROCEDURE — 99214 OFFICE O/P EST MOD 30 MIN: CPT

## 2025-08-27 PROCEDURE — 93306 TTE W/DOPPLER COMPLETE: CPT
